# Patient Record
Sex: FEMALE | Race: OTHER | NOT HISPANIC OR LATINO | Employment: UNEMPLOYED | URBAN - METROPOLITAN AREA
[De-identification: names, ages, dates, MRNs, and addresses within clinical notes are randomized per-mention and may not be internally consistent; named-entity substitution may affect disease eponyms.]

---

## 2023-10-23 LAB
EXTERNAL ABO GROUPING: NORMAL
EXTERNAL ANTIBODY SCREEN: NORMAL
EXTERNAL CHLAMYDIA SCREEN: NEGATIVE
EXTERNAL GONORRHEA SCREEN: NEGATIVE
EXTERNAL HEMOGLOBIN: 10.4 G/DL
EXTERNAL HEPATITIS B SURFACE ANTIGEN: NEGATIVE
EXTERNAL RH FACTOR: POSITIVE
EXTERNAL RUBELLA IGG QUANTITATION: POSITIVE
GLUCOSE, 1 HR SCREEN, PREG EXTERNAL: 85 MG/DL
HEPATITIS C VIRUS AB PRESENCE IN SERUM EXTERNAL: NONREACTIVE
HIV 1/ 2 AG/AB SCREEN EXTERNAL: NEGATIVE
SYPHILIS TOTAL AB EXTERNAL: NEGATIVE
VARICELLA ZOSTER IGG EXTERNAL: POSITIVE

## 2024-01-16 ENCOUNTER — INITIAL PRENATAL (OUTPATIENT)
Dept: OBSTETRICS AND GYNECOLOGY | Facility: HOSPITAL | Age: 34
End: 2024-01-16
Payer: COMMERCIAL

## 2024-01-16 ENCOUNTER — APPOINTMENT (OUTPATIENT)
Dept: LAB | Facility: LAB | Age: 34
End: 2024-01-16
Payer: COMMERCIAL

## 2024-01-16 VITALS — WEIGHT: 188 LBS | DIASTOLIC BLOOD PRESSURE: 67 MMHG | SYSTOLIC BLOOD PRESSURE: 100 MMHG

## 2024-01-16 DIAGNOSIS — Z34.03 ENCOUNTER FOR SUPERVISION OF NORMAL FIRST PREGNANCY IN THIRD TRIMESTER (HHS-HCC): Primary | ICD-10-CM

## 2024-01-16 DIAGNOSIS — Z34.80 SUPERVISION OF OTHER NORMAL PREGNANCY, ANTEPARTUM (HHS-HCC): ICD-10-CM

## 2024-01-16 DIAGNOSIS — O99.019 ANEMIA DURING PREGNANCY (HHS-HCC): ICD-10-CM

## 2024-01-16 DIAGNOSIS — R87.613 HIGH GRADE SQUAMOUS INTRAEPITHELIAL LESION (HGSIL) ON CYTOLOGIC SMEAR OF CERVIX: ICD-10-CM

## 2024-01-16 DIAGNOSIS — Z78.9 LANGUAGE BARRIER: ICD-10-CM

## 2024-01-16 PROCEDURE — 99213 OFFICE O/P EST LOW 20 MIN: CPT | Performed by: OBSTETRICS & GYNECOLOGY

## 2024-01-16 PROCEDURE — 87081 CULTURE SCREEN ONLY: CPT | Performed by: OBSTETRICS & GYNECOLOGY

## 2024-01-16 ASSESSMENT — EDINBURGH POSTNATAL DEPRESSION SCALE (EPDS)
I HAVE BEEN ANXIOUS OR WORRIED FOR NO GOOD REASON: NO, NOT AT ALL
THINGS HAVE BEEN GETTING ON TOP OF ME: NO, I HAVE BEEN COPING AS WELL AS EVER
I HAVE LOOKED FORWARD WITH ENJOYMENT TO THINGS: AS MUCH AS I EVER DID
I HAVE BEEN SO UNHAPPY THAT I HAVE BEEN CRYING: NO, NEVER
I HAVE BLAMED MYSELF UNNECESSARILY WHEN THINGS WENT WRONG: NO, NEVER
THE THOUGHT OF HARMING MYSELF HAS OCCURRED TO ME: NEVER
I HAVE BEEN ABLE TO LAUGH AND SEE THE FUNNY SIDE OF THINGS: AS MUCH AS I ALWAYS COULD
I HAVE FELT SAD OR MISERABLE: NO, NOT AT ALL
I HAVE BEEN SO UNHAPPY THAT I HAVE HAD DIFFICULTY SLEEPING: NOT AT ALL

## 2024-01-16 NOTE — PROGRESS NOTES
Initial Prenatal Visit    SUBJECTIVE  Ricardo Pennington is a 34 y.o.  at 37w4d with an estimated date of delivery of 2024, by Ultrasound who presents for an initial prenatal visit.    Patient is a transfer of care from [facility]. Has not been see since ~28 weeks. Denies contractions, bleeding, leaking fluid. Good fetal movement.    OB/GYN History  OB History    Para Term  AB Living   7 6 6     6   SAB IAB Ectopic Multiple Live Births           6      # Outcome Date GA Lbr Claudy/2nd Weight Sex Delivery Anes PTL Lv   7 Current            6 Term 21 41w0d  3.6 kg M Vag-Spont None N HARSH      [place]   5 Term  40w0d  3.2 kg M Vag-Spont None N HARSH      Birth Comments: born in [place]   4 Term  40w0d  3.1 kg F Vag-Spont None N HARSH      Birth Comments: born in [place]   3 Term  40w0d  3.2 kg F Vag-Spont None N HARSH      Birth Comments: born in [place]   2 Term  40w0d  3.8 kg M Vag-Spont None N HARSH      Birth Comments: born in [place]   1 Term  40w0d  4.1 kg M Vag-Spont None N HARSH      Birth Comments: born in [place]      No LMP recorded. Patient is pregnant.    Prior pregnancy complications: Denies    The following portions of the chart were reviewed this encounter and updated as appropriate:    Tobacco  Allergies  Meds  Problems  Med Hx  Surg Hx  Fam Hx         Review of Systems  Review of Systems   Constitutional:  Negative for chills and fever.   Eyes:  Negative for visual disturbance.   Respiratory:  Negative for cough and shortness of breath.    Cardiovascular:  Negative for chest pain and palpitations.   Gastrointestinal:  Negative for abdominal pain, constipation, diarrhea, nausea and vomiting.   Genitourinary:  Negative for dyspareunia, dysuria, frequency, hematuria, urgency, vaginal bleeding and vaginal discharge.   Neurological:  Negative for dizziness, weakness and headaches.       OBJECTIVE  Vitals:    24 1521   BP: 100/67    Weight: 85.3 kg (188 lb)          Expected Total Weight Gain: Could not be calculated  Pregravid BMI: Could not be calculated    Physical Exam  See prenatal physical exam tab    ASSESSMENT & PLAN    Language barrier  - Swahili  used for entire visit    Encounter for supervision of normal pregnancy, antepartum  - GBS collected today    Anemia during pregnancy  - Taking oral iron  - Repeat CBC today    High grade squamous intraepithelial lesion (HGSIL) on cytologic smear of cervix  - Reviewed her abnormal pap without follow up from 01/2023  - Discussed need for colposcopy in postpartum period  - Consider setting patient up with patient navigator to ensure follow up    Routine care  -Ultrasound reviewed, earliest available 26 weeks  -Initial prenatal labs reviewed and entered  -OB education provided and precautions discussed    Pregnancy: Morris     Delivery Plans  Planned delivery method: Vaginal  Planned delivery location: [facility]      Follow up in 1 weeks for return OB visit.    Jesenia Noland MD  Obstetrics & Gynecology  01/17/24

## 2024-01-17 PROBLEM — Z34.90 ENCOUNTER FOR SUPERVISION OF NORMAL PREGNANCY, ANTEPARTUM (HHS-HCC): Status: ACTIVE | Noted: 2024-01-17

## 2024-01-17 PROBLEM — R87.613 HIGH GRADE SQUAMOUS INTRAEPITHELIAL LESION (HGSIL) ON CYTOLOGIC SMEAR OF CERVIX: Status: ACTIVE | Noted: 2024-01-17

## 2024-01-17 PROBLEM — Z78.9 LANGUAGE BARRIER: Status: ACTIVE | Noted: 2024-01-17

## 2024-01-17 PROBLEM — O99.019 ANEMIA DURING PREGNANCY (HHS-HCC): Status: ACTIVE | Noted: 2024-01-17

## 2024-01-17 ASSESSMENT — ENCOUNTER SYMPTOMS
ABDOMINAL PAIN: 0
WEAKNESS: 0
HEADACHES: 0
HEMATURIA: 0
NAUSEA: 0
DIARRHEA: 0
PALPITATIONS: 0
FEVER: 0
COUGH: 0
FREQUENCY: 0
SHORTNESS OF BREATH: 0
CHILLS: 0
DIZZINESS: 0
VOMITING: 0
DYSURIA: 0
CONSTIPATION: 0

## 2024-01-17 NOTE — ASSESSMENT & PLAN NOTE
- Reviewed her abnormal pap without follow up from 01/2023  - Discussed need for colposcopy in postpartum period  - Consider setting patient up with patient navigator to ensure follow up

## 2024-01-18 LAB — GP B STREP GENITAL QL CULT: NORMAL

## 2024-01-23 ENCOUNTER — ROUTINE PRENATAL (OUTPATIENT)
Dept: OBSTETRICS AND GYNECOLOGY | Facility: HOSPITAL | Age: 34
End: 2024-01-23
Payer: COMMERCIAL

## 2024-01-23 VITALS — WEIGHT: 192 LBS | DIASTOLIC BLOOD PRESSURE: 66 MMHG | SYSTOLIC BLOOD PRESSURE: 106 MMHG

## 2024-01-23 DIAGNOSIS — Z34.03 ENCOUNTER FOR SUPERVISION OF NORMAL FIRST PREGNANCY IN THIRD TRIMESTER (HHS-HCC): ICD-10-CM

## 2024-01-23 PROCEDURE — 99213 OFFICE O/P EST LOW 20 MIN: CPT | Mod: TH | Performed by: OBSTETRICS & GYNECOLOGY

## 2024-01-23 PROCEDURE — 99213 OFFICE O/P EST LOW 20 MIN: CPT | Performed by: OBSTETRICS & GYNECOLOGY

## 2024-01-23 ASSESSMENT — ENCOUNTER SYMPTOMS
DEPRESSION: 0
OCCASIONAL FEELINGS OF UNSTEADINESS: 0
LOSS OF SENSATION IN FEET: 0

## 2024-01-23 NOTE — PROGRESS NOTES
SUBJECTIVE  Ricardo Pennington is a 34 y.o.  at 38w4d with an estimated date of delivery of 2024, by Ultrasound who presents for a routine prenatal visit.    She denies loss of fluid, vaginal bleeding, regular contractions/cramping, and decreased fetal movement.    OBJECTIVE  Vitals:    24 1416   BP: 106/66   Weight: 87.1 kg (192 lb)          ASSESSMENT & PLAN    Encounter for supervision of normal pregnancy, antepartum  - Up to date on care  - Precautions discussed    Follow up in 1 weeks for return OB visit.    Jesenia Noland MD  Obstetrics & Gynecology  24

## 2024-01-30 ENCOUNTER — HOSPITAL ENCOUNTER (INPATIENT)
Facility: HOSPITAL | Age: 34
LOS: 2 days | Discharge: HOME | End: 2024-02-01
Attending: OBSTETRICS & GYNECOLOGY | Admitting: OBSTETRICS & GYNECOLOGY
Payer: COMMERCIAL

## 2024-01-30 ENCOUNTER — ROUTINE PRENATAL (OUTPATIENT)
Dept: OBSTETRICS AND GYNECOLOGY | Facility: HOSPITAL | Age: 34
End: 2024-01-30
Payer: COMMERCIAL

## 2024-01-30 VITALS — SYSTOLIC BLOOD PRESSURE: 95 MMHG | WEIGHT: 187 LBS | DIASTOLIC BLOOD PRESSURE: 63 MMHG

## 2024-01-30 DIAGNOSIS — Z37.9 NORMAL LABOR (HHS-HCC): Primary | ICD-10-CM

## 2024-01-30 DIAGNOSIS — Z34.03 ENCOUNTER FOR SUPERVISION OF NORMAL FIRST PREGNANCY IN THIRD TRIMESTER (HHS-HCC): ICD-10-CM

## 2024-01-30 LAB
ABO GROUP (TYPE) IN BLOOD: NORMAL
ANTIBODY SCREEN: NORMAL
ERYTHROCYTE [DISTWIDTH] IN BLOOD BY AUTOMATED COUNT: 14.6 % (ref 11.5–14.5)
HCT VFR BLD AUTO: 34.9 % (ref 36–46)
HGB BLD-MCNC: 11.2 G/DL (ref 12–16)
MCH RBC QN AUTO: 27.9 PG (ref 26–34)
MCHC RBC AUTO-ENTMCNC: 32.1 G/DL (ref 32–36)
MCV RBC AUTO: 87 FL (ref 80–100)
NRBC BLD-RTO: 0 /100 WBCS (ref 0–0)
PLATELET # BLD AUTO: 236 X10*3/UL (ref 150–450)
RBC # BLD AUTO: 4.01 X10*6/UL (ref 4–5.2)
RH FACTOR (ANTIGEN D): NORMAL
TREPONEMA PALLIDUM IGG+IGM AB [PRESENCE] IN SERUM OR PLASMA BY IMMUNOASSAY: REACTIVE
WBC # BLD AUTO: 9.6 X10*3/UL (ref 4.4–11.3)

## 2024-01-30 PROCEDURE — 36415 COLL VENOUS BLD VENIPUNCTURE: CPT | Performed by: STUDENT IN AN ORGANIZED HEALTH CARE EDUCATION/TRAINING PROGRAM

## 2024-01-30 PROCEDURE — 85027 COMPLETE CBC AUTOMATED: CPT | Performed by: STUDENT IN AN ORGANIZED HEALTH CARE EDUCATION/TRAINING PROGRAM

## 2024-01-30 PROCEDURE — 99213 OFFICE O/P EST LOW 20 MIN: CPT | Mod: TH,27 | Performed by: OBSTETRICS & GYNECOLOGY

## 2024-01-30 PROCEDURE — 99213 OFFICE O/P EST LOW 20 MIN: CPT | Performed by: OBSTETRICS & GYNECOLOGY

## 2024-01-30 PROCEDURE — 86780 TREPONEMA PALLIDUM: CPT | Performed by: STUDENT IN AN ORGANIZED HEALTH CARE EDUCATION/TRAINING PROGRAM

## 2024-01-30 PROCEDURE — 99222 1ST HOSP IP/OBS MODERATE 55: CPT | Performed by: STUDENT IN AN ORGANIZED HEALTH CARE EDUCATION/TRAINING PROGRAM

## 2024-01-30 PROCEDURE — 59050 FETAL MONITOR W/REPORT: CPT

## 2024-01-30 PROCEDURE — 59409 OBSTETRICAL CARE: CPT | Performed by: OBSTETRICS & GYNECOLOGY

## 2024-01-30 PROCEDURE — 7210000002 HC LABOR PER HOUR

## 2024-01-30 PROCEDURE — 59409 OBSTETRICAL CARE: CPT

## 2024-01-30 PROCEDURE — 2500000004 HC RX 250 GENERAL PHARMACY W/ HCPCS (ALT 636 FOR OP/ED): Performed by: STUDENT IN AN ORGANIZED HEALTH CARE EDUCATION/TRAINING PROGRAM

## 2024-01-30 PROCEDURE — 86318 IA INFECTIOUS AGENT ANTIBODY: CPT | Performed by: STUDENT IN AN ORGANIZED HEALTH CARE EDUCATION/TRAINING PROGRAM

## 2024-01-30 PROCEDURE — 1210000001 HC SEMI-PRIVATE ROOM DAILY

## 2024-01-30 PROCEDURE — 7100000016 HC LABOR RECOVERY PER HOUR

## 2024-01-30 PROCEDURE — 86901 BLOOD TYPING SEROLOGIC RH(D): CPT | Performed by: STUDENT IN AN ORGANIZED HEALTH CARE EDUCATION/TRAINING PROGRAM

## 2024-01-30 PROCEDURE — 2500000001 HC RX 250 WO HCPCS SELF ADMINISTERED DRUGS (ALT 637 FOR MEDICARE OP)

## 2024-01-30 RX ORDER — METOCLOPRAMIDE HYDROCHLORIDE 5 MG/ML
10 INJECTION INTRAMUSCULAR; INTRAVENOUS EVERY 6 HOURS PRN
Status: DISCONTINUED | OUTPATIENT
Start: 2024-01-30 | End: 2024-01-30

## 2024-01-30 RX ORDER — MISOPROSTOL 200 UG/1
800 TABLET ORAL ONCE AS NEEDED
Status: DISCONTINUED | OUTPATIENT
Start: 2024-01-30 | End: 2024-02-01 | Stop reason: HOSPADM

## 2024-01-30 RX ORDER — OXYTOCIN/0.9 % SODIUM CHLORIDE 30/500 ML
60 PLASTIC BAG, INJECTION (ML) INTRAVENOUS ONCE AS NEEDED
Status: DISCONTINUED | OUTPATIENT
Start: 2024-01-30 | End: 2024-01-30

## 2024-01-30 RX ORDER — NIFEDIPINE 10 MG/1
10 CAPSULE ORAL ONCE AS NEEDED
Status: DISCONTINUED | OUTPATIENT
Start: 2024-01-30 | End: 2024-01-30

## 2024-01-30 RX ORDER — SIMETHICONE 80 MG
80 TABLET,CHEWABLE ORAL 4 TIMES DAILY PRN
Status: DISCONTINUED | OUTPATIENT
Start: 2024-01-30 | End: 2024-02-01 | Stop reason: HOSPADM

## 2024-01-30 RX ORDER — LABETALOL HYDROCHLORIDE 5 MG/ML
20 INJECTION, SOLUTION INTRAVENOUS ONCE AS NEEDED
Status: DISCONTINUED | OUTPATIENT
Start: 2024-01-30 | End: 2024-02-01 | Stop reason: HOSPADM

## 2024-01-30 RX ORDER — ONDANSETRON HYDROCHLORIDE 2 MG/ML
4 INJECTION, SOLUTION INTRAVENOUS EVERY 6 HOURS PRN
Status: DISCONTINUED | OUTPATIENT
Start: 2024-01-30 | End: 2024-01-30

## 2024-01-30 RX ORDER — LIDOCAINE 560 MG/1
1 PATCH PERCUTANEOUS; TOPICAL; TRANSDERMAL
Status: DISCONTINUED | OUTPATIENT
Start: 2024-01-30 | End: 2024-02-01 | Stop reason: HOSPADM

## 2024-01-30 RX ORDER — TRANEXAMIC ACID 100 MG/ML
1000 INJECTION, SOLUTION INTRAVENOUS ONCE AS NEEDED
Status: DISCONTINUED | OUTPATIENT
Start: 2024-01-30 | End: 2024-01-30

## 2024-01-30 RX ORDER — TRANEXAMIC ACID 100 MG/ML
1000 INJECTION, SOLUTION INTRAVENOUS ONCE AS NEEDED
Status: DISCONTINUED | OUTPATIENT
Start: 2024-01-30 | End: 2024-02-01 | Stop reason: HOSPADM

## 2024-01-30 RX ORDER — OXYTOCIN 10 [USP'U]/ML
10 INJECTION, SOLUTION INTRAMUSCULAR; INTRAVENOUS ONCE AS NEEDED
Status: DISCONTINUED | OUTPATIENT
Start: 2024-01-30 | End: 2024-01-30

## 2024-01-30 RX ORDER — OXYTOCIN 10 [USP'U]/ML
10 INJECTION, SOLUTION INTRAMUSCULAR; INTRAVENOUS ONCE AS NEEDED
Status: DISCONTINUED | OUTPATIENT
Start: 2024-01-30 | End: 2024-02-01 | Stop reason: HOSPADM

## 2024-01-30 RX ORDER — LIDOCAINE HYDROCHLORIDE 10 MG/ML
30 INJECTION INFILTRATION; PERINEURAL ONCE AS NEEDED
Status: DISCONTINUED | OUTPATIENT
Start: 2024-01-30 | End: 2024-01-30

## 2024-01-30 RX ORDER — POLYETHYLENE GLYCOL 3350 17 G/17G
17 POWDER, FOR SOLUTION ORAL 2 TIMES DAILY PRN
Status: DISCONTINUED | OUTPATIENT
Start: 2024-01-30 | End: 2024-02-01 | Stop reason: HOSPADM

## 2024-01-30 RX ORDER — ONDANSETRON HYDROCHLORIDE 2 MG/ML
4 INJECTION, SOLUTION INTRAVENOUS EVERY 6 HOURS PRN
Status: DISCONTINUED | OUTPATIENT
Start: 2024-01-30 | End: 2024-02-01 | Stop reason: HOSPADM

## 2024-01-30 RX ORDER — ONDANSETRON 4 MG/1
4 TABLET, FILM COATED ORAL EVERY 6 HOURS PRN
Status: DISCONTINUED | OUTPATIENT
Start: 2024-01-30 | End: 2024-01-30

## 2024-01-30 RX ORDER — METHYLERGONOVINE MALEATE 0.2 MG/ML
0.2 INJECTION INTRAVENOUS ONCE AS NEEDED
Status: DISCONTINUED | OUTPATIENT
Start: 2024-01-30 | End: 2024-02-01 | Stop reason: HOSPADM

## 2024-01-30 RX ORDER — ADHESIVE BANDAGE
30 BANDAGE TOPICAL
Status: DISCONTINUED | OUTPATIENT
Start: 2024-01-30 | End: 2024-02-01 | Stop reason: HOSPADM

## 2024-01-30 RX ORDER — SODIUM CHLORIDE, SODIUM LACTATE, POTASSIUM CHLORIDE, CALCIUM CHLORIDE 600; 310; 30; 20 MG/100ML; MG/100ML; MG/100ML; MG/100ML
125 INJECTION, SOLUTION INTRAVENOUS CONTINUOUS
Status: DISCONTINUED | OUTPATIENT
Start: 2024-01-30 | End: 2024-01-30

## 2024-01-30 RX ORDER — DIPHENHYDRAMINE HYDROCHLORIDE 50 MG/ML
25 INJECTION INTRAMUSCULAR; INTRAVENOUS EVERY 6 HOURS PRN
Status: DISCONTINUED | OUTPATIENT
Start: 2024-01-30 | End: 2024-02-01 | Stop reason: HOSPADM

## 2024-01-30 RX ORDER — IBUPROFEN 600 MG/1
600 TABLET ORAL EVERY 6 HOURS
Status: DISCONTINUED | OUTPATIENT
Start: 2024-01-30 | End: 2024-02-01 | Stop reason: HOSPADM

## 2024-01-30 RX ORDER — LOPERAMIDE HYDROCHLORIDE 2 MG/1
4 CAPSULE ORAL EVERY 2 HOUR PRN
Status: DISCONTINUED | OUTPATIENT
Start: 2024-01-30 | End: 2024-01-30

## 2024-01-30 RX ORDER — HYDRALAZINE HYDROCHLORIDE 20 MG/ML
5 INJECTION INTRAMUSCULAR; INTRAVENOUS ONCE AS NEEDED
Status: DISCONTINUED | OUTPATIENT
Start: 2024-01-30 | End: 2024-01-30

## 2024-01-30 RX ORDER — BISACODYL 10 MG/1
10 SUPPOSITORY RECTAL DAILY PRN
Status: DISCONTINUED | OUTPATIENT
Start: 2024-01-30 | End: 2024-02-01 | Stop reason: HOSPADM

## 2024-01-30 RX ORDER — METOCLOPRAMIDE 10 MG/1
10 TABLET ORAL EVERY 6 HOURS PRN
Status: DISCONTINUED | OUTPATIENT
Start: 2024-01-30 | End: 2024-01-30

## 2024-01-30 RX ORDER — MISOPROSTOL 200 UG/1
800 TABLET ORAL ONCE AS NEEDED
Status: DISCONTINUED | OUTPATIENT
Start: 2024-01-30 | End: 2024-01-30

## 2024-01-30 RX ORDER — METHYLERGONOVINE MALEATE 0.2 MG/ML
0.2 INJECTION INTRAVENOUS ONCE AS NEEDED
Status: DISCONTINUED | OUTPATIENT
Start: 2024-01-30 | End: 2024-01-30

## 2024-01-30 RX ORDER — HYDRALAZINE HYDROCHLORIDE 20 MG/ML
5 INJECTION INTRAMUSCULAR; INTRAVENOUS ONCE AS NEEDED
Status: DISCONTINUED | OUTPATIENT
Start: 2024-01-30 | End: 2024-02-01 | Stop reason: HOSPADM

## 2024-01-30 RX ORDER — LOPERAMIDE HYDROCHLORIDE 2 MG/1
4 CAPSULE ORAL EVERY 2 HOUR PRN
Status: DISCONTINUED | OUTPATIENT
Start: 2024-01-30 | End: 2024-02-01 | Stop reason: HOSPADM

## 2024-01-30 RX ORDER — LABETALOL HYDROCHLORIDE 5 MG/ML
20 INJECTION, SOLUTION INTRAVENOUS ONCE AS NEEDED
Status: DISCONTINUED | OUTPATIENT
Start: 2024-01-30 | End: 2024-01-30

## 2024-01-30 RX ORDER — CARBOPROST TROMETHAMINE 250 UG/ML
250 INJECTION, SOLUTION INTRAMUSCULAR ONCE AS NEEDED
Status: DISCONTINUED | OUTPATIENT
Start: 2024-01-30 | End: 2024-02-01 | Stop reason: HOSPADM

## 2024-01-30 RX ORDER — CARBOPROST TROMETHAMINE 250 UG/ML
250 INJECTION, SOLUTION INTRAMUSCULAR ONCE AS NEEDED
Status: DISCONTINUED | OUTPATIENT
Start: 2024-01-30 | End: 2024-01-30

## 2024-01-30 RX ORDER — DIPHENHYDRAMINE HCL 25 MG
25 CAPSULE ORAL EVERY 6 HOURS PRN
Status: DISCONTINUED | OUTPATIENT
Start: 2024-01-30 | End: 2024-02-01 | Stop reason: HOSPADM

## 2024-01-30 RX ORDER — NIFEDIPINE 10 MG/1
10 CAPSULE ORAL ONCE AS NEEDED
Status: DISCONTINUED | OUTPATIENT
Start: 2024-01-30 | End: 2024-02-01 | Stop reason: HOSPADM

## 2024-01-30 RX ORDER — TERBUTALINE SULFATE 1 MG/ML
0.25 INJECTION SUBCUTANEOUS ONCE AS NEEDED
Status: DISCONTINUED | OUTPATIENT
Start: 2024-01-30 | End: 2024-01-30

## 2024-01-30 RX ORDER — ACETAMINOPHEN 325 MG/1
975 TABLET ORAL EVERY 6 HOURS
Status: DISCONTINUED | OUTPATIENT
Start: 2024-01-30 | End: 2024-02-01 | Stop reason: HOSPADM

## 2024-01-30 RX ORDER — ONDANSETRON 4 MG/1
4 TABLET, FILM COATED ORAL EVERY 6 HOURS PRN
Status: DISCONTINUED | OUTPATIENT
Start: 2024-01-30 | End: 2024-02-01 | Stop reason: HOSPADM

## 2024-01-30 RX ORDER — OXYTOCIN/0.9 % SODIUM CHLORIDE 30/500 ML
60 PLASTIC BAG, INJECTION (ML) INTRAVENOUS ONCE AS NEEDED
Status: DISCONTINUED | OUTPATIENT
Start: 2024-01-30 | End: 2024-02-01 | Stop reason: HOSPADM

## 2024-01-30 RX ADMIN — IBUPROFEN 600 MG: 600 TABLET ORAL at 21:36

## 2024-01-30 RX ADMIN — SODIUM CHLORIDE, POTASSIUM CHLORIDE, SODIUM LACTATE AND CALCIUM CHLORIDE 125 ML/HR: 600; 310; 30; 20 INJECTION, SOLUTION INTRAVENOUS at 20:00

## 2024-01-30 RX ADMIN — ACETAMINOPHEN 975 MG: 325 TABLET ORAL at 21:36

## 2024-01-30 SDOH — HEALTH STABILITY: MENTAL HEALTH: WERE YOU ABLE TO COMPLETE ALL THE BEHAVIORAL HEALTH SCREENINGS?: NO

## 2024-01-30 ASSESSMENT — PAIN - FUNCTIONAL ASSESSMENT: PAIN_FUNCTIONAL_ASSESSMENT: 0-10

## 2024-01-30 ASSESSMENT — PAIN SCALES - GENERAL
PAINLEVEL_OUTOF10: 1
PAINLEVEL_OUTOF10: 1
PAINLEVEL_OUTOF10: 0 - NO PAIN

## 2024-01-30 NOTE — ASSESSMENT & PLAN NOTE
- Declines induction at 41 weeks  - Discussed I am suspicious for early labor symptoms  - Planning to go to L&D if contractions get any closer, has bleeding, leaking fluid, or decreased fetal movement

## 2024-01-30 NOTE — PROGRESS NOTES
SUBJECTIVE  Ricardo Pennington is a 34 y.o.  at 39w4d with an estimated date of delivery of 2024, by Ultrasound who presents for a routine prenatal visit.    She denies loss of fluid, vaginal bleeding, contractions every 20 minutes, and decreased fetal movement.    OBJECTIVE  Vitals:    24 1414   BP: 95/63   Weight: 84.8 kg (187 lb)          ASSESSMENT & PLAN    Encounter for supervision of normal pregnancy, antepartum  - Declines induction at 41 weeks  - Discussed I am suspicious for early labor symptoms  - Planning to go to L&D if contractions get any closer, has bleeding, leaking fluid, or decreased fetal movement    Follow up in 1 weeks for return OB visit.    Jesenia Noland MD  Obstetrics & Gynecology  24

## 2024-01-31 LAB
ABO GROUP (TYPE) IN BLOOD: NORMAL
RH FACTOR (ANTIGEN D): NORMAL
RPR SER QL: NONREACTIVE
T PALLIDUM AB SER QL AGGL: NONREACTIVE

## 2024-01-31 PROCEDURE — 2500000001 HC RX 250 WO HCPCS SELF ADMINISTERED DRUGS (ALT 637 FOR MEDICARE OP)

## 2024-01-31 PROCEDURE — 36415 COLL VENOUS BLD VENIPUNCTURE: CPT | Performed by: OBSTETRICS & GYNECOLOGY

## 2024-01-31 PROCEDURE — 1210000001 HC SEMI-PRIVATE ROOM DAILY

## 2024-01-31 RX ADMIN — ACETAMINOPHEN 975 MG: 325 TABLET ORAL at 21:14

## 2024-01-31 RX ADMIN — IBUPROFEN 600 MG: 600 TABLET ORAL at 15:57

## 2024-01-31 RX ADMIN — IBUPROFEN 600 MG: 600 TABLET ORAL at 21:14

## 2024-01-31 RX ADMIN — ACETAMINOPHEN 975 MG: 325 TABLET ORAL at 15:56

## 2024-01-31 RX ADMIN — ACETAMINOPHEN 975 MG: 325 TABLET ORAL at 04:01

## 2024-01-31 RX ADMIN — IBUPROFEN 600 MG: 600 TABLET ORAL at 10:02

## 2024-01-31 RX ADMIN — IBUPROFEN 600 MG: 600 TABLET ORAL at 04:01

## 2024-01-31 RX ADMIN — ACETAMINOPHEN 975 MG: 325 TABLET ORAL at 10:24

## 2024-01-31 SDOH — HEALTH STABILITY: MENTAL HEALTH: NON-SPECIFIC ACTIVE SUICIDAL THOUGHTS (PAST 1 MONTH): NO

## 2024-01-31 SDOH — HEALTH STABILITY: MENTAL HEALTH: WISH TO BE DEAD (PAST 1 MONTH): NO

## 2024-01-31 SDOH — HEALTH STABILITY: MENTAL HEALTH: SUICIDAL BEHAVIOR (LIFETIME): NO

## 2024-01-31 ASSESSMENT — PAIN SCALES - GENERAL
PAINLEVEL_OUTOF10: 0 - NO PAIN

## 2024-01-31 NOTE — CARE PLAN
Problem: Postpartum  Goal: Experiences normal postpartum course  Outcome: Progressing     Problem: Postpartum  Goal: Minimal s/sx of HDP and BP<160/110  Outcome: Progressing

## 2024-01-31 NOTE — L&D DELIVERY NOTE
OB Delivery Note  2024  Ricardo Pennington  34 y.o.   Vaginal, Spontaneous       after spontaneous onset of labor. Patient with SROM shortly prior to delivery. Hemostatic first degree laceration noted and not repaired.    Gestational Age: 39w4d  /Para:   Estimated Blood Loss From Delivery: 200 mL    Aditi David [[id number]]      Labor Events    Rupture type: Spontaneous  Fluid color: Meconium  Labor type: Spontaneous Onset of Labor  Labor allowed to proceed with plans for an attempted vaginal birth?: Yes  Augmentation: None  Complications: None       Labor Event Times    Labor onset date/time: 2024  Dilation complete date/time: 2024  Start pushing date/time: 2024       Labor Length    1st stage: 0h 47m  2nd stage: 0h 01m  3rd stage: 0h 05m       Placenta    Placenta delivery date/time: 2024  Placenta removal: Spontaneous  Placenta appearance: Intact  Placenta disposition: discarded       Cord    Vessels: 3 vessels  Complications: None  Delayed cord clamping?: Yes  Cord clamped date/time: 2024  Cord blood disposition: Lab  Gases sent?: No       Lacerations    Episiotomy: None  Perineal laceration: 1st  Perineal laceration repaired?: No  Other lacerations?: No  Repair suture: None       Anesthesia    Method: None       Operative Delivery    Forceps attempted?: No  Vacuum extractor attempted?: No       Shoulder Dystocia    Shoulder dystocia present?: No       [place] Delivery    Time head delivered: 2024 20:13:00  Birth date/time: 2024 20:13:00  Delivery type: Vaginal, Spontaneous  Complications: None       Resuscitation    Method: Suctioning, Tactile stimulation       Apgars    Living status: Living  Apgar Component Scores:  1 min.:  5 min.:  10 min.:  15 min.:  20 min.:    Skin color:  1  1       Heart rate:  2  2       Reflex irritability:  2  2       Muscle tone:  1  2       Respiratory effort:  2  2       Total:  8  9       Apgars  assigned by: AMEYA SHELL       Delivery Providers    Delivering clinician: Radha Willett MD   Provider Role    Catrachita Kirby, RN Delivery Nurse    Ameya Shell, RN Nursery Nurse    Edel Grewal MD Resident                 Edel Grewal MD PGY1

## 2024-01-31 NOTE — LACTATION NOTE
Lactation Consultant Note  Lactation Consultation  Reason for Consult: Initial assessment  Consultant Name: SERA Galan ( [id number] utilized for visit)    Maternal Information  Infant to breast within first 2 hours of birth?: Yes    Maternal Assessment       Infant Assessment  Infant Behavior: Content after feeding    Feeding Assessment  Nutrition Source: Breastmilk, Formula (per mother’s request)  Feeding Method: Nursing at the breast, Paced bottle  Unable to assess infant feeding at this time: Maternal request    LATCH TOOL       Breast Pump       Other OB Lactation Tools       Patient Follow-up  Inpatient Lactation Follow-up Needed : No (as desired by parent)  Lactation Professional - OK to Discharge: Yes    Other OB Lactation Documentation       Recommendations/Summary  Mother reports that she is experienced and feels that she has no milk at this time. She has decided to supplement with formula during the colostrum phase and plans to continue breastfeeding. She denies needs or concerns at this time. She is aware of support services for lactation available for her.

## 2024-01-31 NOTE — DISCHARGE INSTRUCTIONS

## 2024-01-31 NOTE — H&P
Obstetrical Admission History and Physical     Ricardo Pennington is a 34 y.o.  at 39w4d. ALLYN: 2024, by Ultrasound. Estimated fetal weight: 8lb by [place]. She has had prenatal care with Dr. Noland .    Chief Complaint: No chief complaint on file.    Assessment/Plan    Active Labor  - Regular contractions every 2 minutes   - SVE 5/70/-2, 4/50/-3 in office earlier today   - Admit to L&D for labor  - Scanned cephalic   - Consented by Dr. Grewal  - Monitor VS per unit protocol  - Routine labs ordered   - Type and Cross 1u PRBC  - Encourage frequent position changes  - Regular diet  - Continuous fetal monitoring  - Pain management per pt request, declines epidural at this time  - Continue assessment of maternal and fetal well being  - Recheck as clinically indicated by maternal and/or fetal status  - Will AROM and/or start Pitocin for labor augmentation as needed  - Anticipate SVB    Maternal Well-being  - Vital signs stable and WNL  - Emotional support and reassurance provided  - All questions and concerns addressed    IUP @ 39w4d  - prenatal lab work reviewed  - Cat 2 tracing   - GBS negative  - continue routine prenatal care     Dispo: admit to [facility] for labor    Dr. Willett aware of admission  Pt report to L&D night team for further management of care    Irish Crane PA-C  24 7:54 PM  Vocera    Active Problems:  There are no active Hospital Problems.      Pregnancy Problems (from 24 to present)       Problem Noted Resolved    Encounter for supervision of normal pregnancy, antepartum 2024 by Jesenia Noland MD No    Priority:  Medium      Overview Addendum 2024  3:17 PM by Jesenia Noland MD     [] Initial BMI:   [x] Dating US: 26 w earliest viewed  [x] Prenatal Labs:   [x] Pap: Abnormal - see problem  [] Genetic Screening: Missed  [] bASA: Missed  [x] Anatomy US: Normal at [facility]  [x] 1hr GCT at 24-28wks: 85  [x] Tdap (27-36wks): Missed  [] Flu/COVID:   [] Rhogam (if Rh neg):    [x] GBS at 36 wks:   [x] Breastfeeding: Yes  [x] Postpartum Birth control method: Depo  [x] 39 weeks discussion of IOL vs. Expectant management: Discussed, inadequate dating  [x] Mode of delivery: Anticipate            Anemia during pregnancy 2024 by Jesenia Noland MD No    Priority:  Medium      High grade squamous intraepithelial lesion (HGSIL) on cytologic smear of cervix 2024 by Jesenia Noland MD No    Priority:  Medium      Grand multiparity in labor and delivery, unspecified trimester 2024 by Jesenia Noland MD No    Priority:  Medium      Language barrier 2024 by Jesenia Noland MD No    Priority:  Medium            Options for delivery have been discussed with the patient and she elects a vaginal delivery.  Labor has been discussed in detail. The risks, benefits, complications, alternatives, expected outcomes, potential problems during recuperation and recovery, and the risks of not performing the procedure were discussed with the patient. The patient stated understanding that the risks of delivery include, but are not limited to: death; reaction to medications; injury to bowel, bladder, ureters, uterus, cervix, vagina, and other pelvic and abdominal structures, infection; blood loss and possible need for transfusion; and potential need for surgery, including hysterectomy. The risks of injury to the infant during delivery were also discussed. All questions were answered. There was concurrence with the planned procedure, and the patient wanted to proceed.    Admit to inpatient status. I anticipate that this patient will require a stay exceeding at least 2 midnights for delivery and postpartum care.  Active management of labor.  Management of pregnancy complications, as indicated.    Minh Silva is a 34 y.o.  at 39w4d by stated ALLYN who presents to triage for labor. Contractions every 2 minutes. Has had some VB today. Denies LOF. Reports good FM.      Pregnancy notable for:   - language barrier, needs Swili    - anemia, on PO Fe  - transfer of care from [facility] at 37 weeks  - no prenatal care from 28-37 weeks   - HSIL pap 2023, for postpartum colposcopy     Obstetrical History   OB History    Para Term  AB Living   7 6 6     6   SAB IAB Ectopic Multiple Live Births           6      # Outcome Date GA Lbr Claudy/2nd Weight Sex Delivery Anes PTL Lv   7 Current            6 Term 21 41w0d  3.6 kg M Vag-Spont None N HARSH      [place]irth Comments[place]   5 Term  40w0d  3.2 kg M Vag-Spont None N HARSH      Birth Comments: born in [place]   4 Term  40w0d  3.1 kg F Vag-Spont None N HARSH      Birth Comments: born in [place]   3 Term  40w0d  3.2 kg F Vag-Spont None N HARSH      Birth Comments: born in [place]   2 Term  40w0d  3.8 kg M Vag-Spont None N HARSH      Birth Comments: born in [place]   1 Term  40w0d  4.1 kg M Vag-Spont None N HARSH      Birth Comments: born in [place]       Past Medical History  No past medical history on file.     Past Surgical History   No past surgical history on file.    Social History  Social History     Tobacco Use    Smoking status: Never    Smokeless tobacco: Never   Substance Use Topics    Alcohol use: Never     Substance and Sexual Activity   Drug Use Never       Allergies  Patient has no known allergies.     Medications  No medications prior to admission.       Objective    Last Vitals  Temp Pulse Resp BP MAP O2 Sat     99       98 %     Physical Examination  GENERAL: Examination reveals a well developed, well nourished, gravid female in no acute distress. She is alert and cooperative.  LUNGS: Normal respiratory effort  ABDOMEN:  palpates firm with contractions  CERVIX: 5/70/-2  NEUROLOGICAL: alert, oriented, normal speech, no focal findings or movement disorder noted  PSYCHOLOGICAL: awake and alert; oriented to person, place, and time    Non-Stress Test   Baseline Fetal  Heart Rate for Non-Stress Test: 135 BPM  Variability in Waveform for Non-Stress Test: (!) Minimal  Accelerations in Non-Stress Test: No  Decelerations in Non-Stress Test: (!) Late (1943, 1955)  Contractions in Non-Stress Test: Regular  NST Contraction Frequency: every 2 minutes  Interpretation of Non-Stress Test   Interpretation of Non-Stress Test: (!) Non-reactive  Comments on Non-Stress Test: Cat 2 Tracing    Lab Review  Labs in chart were reviewed.

## 2024-01-31 NOTE — PROGRESS NOTES
Postpartum Progress Note    Assessment/Plan   Ricardo Pennington is a 34 y.o., , who delivered at 39w4d gestation    Now PPD#1 s/p Vaginal, Spontaneous  on 2024   - continue routine postpartum care  - pain well controlled on po medications  - dvt risk score DVT Score: 2 , ppx with scds  - Hgb:   Results from last 7 days   Lab Units 24   HEMOGLOBIN g/dL 11.2*      + Syphilis Screen  - antibody + with negative confirmatory testing 2023, antibody negative Oct 2023   - antibody + on admission, confirmation testing pending  - discussed with patient, no new partners, will fu on pending lab    Maternal Well-Being  - emotional support provided  - HSIL, PP colpo scheduled     [facility] Feeding  - breastfeeding/pumping encouraged; lactation consult prn    Contraception  - education provided  - depo shot prior to discharge     Dispo  - anticipate d/c on PPD #2 if meeting all postpartum milestones  Follow up in 4-6 wk for postpartum visit with your OB provider.     Earlene Zepeda PA-C  Postpartum Pager [phone]    Principal Problem:    Normal labor    Pregnancy Problems (from 24 to present)       Problem Noted Resolved    Encounter for supervision of normal pregnancy, antepartum 2024 by Jesenia Noland MD No    Priority:  Medium      Overview Addendum 2024  3:17 PM by Jesenia Noland MD     [] Initial BMI:   [x] Dating US: 26 w earliest viewed  [x] Prenatal Labs:   [x] Pap: Abnormal - see problem  [] Genetic Screening: Missed  [] bASA: Missed  [x] Anatomy US: Normal at [facility]  [x] 1hr GCT at 24-28wks: 85  [x] Tdap (27-36wks): Missed  [] Flu/COVID:   [] Rhogam (if Rh neg):   [x] GBS at 36 wks:   [x] Breastfeeding: Yes  [x] Postpartum Birth control method: Depo  [x] 39 weeks discussion of IOL vs. Expectant management: Discussed, inadequate dating  [x] Mode of delivery: Anticipate            Anemia during pregnancy 2024 by Jesenia Noland MD No    Priority:  Medium      High  grade squamous intraepithelial lesion (HGSIL) on cytologic smear of cervix 1/17/2024 by Jesenia Noland MD No    Priority:  Medium      Language barrier 1/17/2024 by Jesenia Noland MD No    Priority:  Medium      Grand multiparity in labor and delivery, unspecified trimester 1/17/2024 by Jesenia Noland MD 1/31/2024 by COURT Villasenor-CNP          Hospital course: no complications  Vaginal Birth  Patient is currently breastfeedingThe patient's blood type is O POS. The baby's blood type is B POS . Rhogam is not indicated.    Subjective   Her pain is well controlled with current medications  She is passing flatus  She is ambulating well  She is tolerating a Adult diet Regular  She reports no breast or nursing problems  She denies emotional concerns today   Her plan for contraception is Depo Provera     Pt seen at the bedside in NAD. Denies pain. Doing well.   Denies CP, SOB, calf pain, fever, passage of large clots.     Objective   Allergies:   Patient has no known allergies.         Last Vitals:  Temp Pulse Resp BP MAP Pulse Ox   36.8 °C (98.2 °F) 74 16 102/65   95 %     Vitals Min/Max Last 24 Hours:  Temp  Min: 36.2 °C (97.2 °F)  Max: 37.3 °C (99.1 °F)  Pulse  Min: 56  Max: 104  Resp  Min: 16  Max: 20  BP  Min: 93/54  Max: 120/61    Intake/Output:     Intake/Output Summary (Last 24 hours) at 1/31/2024 1216  Last data filed at 1/30/2024 2200  Gross per 24 hour   Intake --   Output 475 ml   Net -475 ml       Physical Exam:  General: Examination reveals a well developed, well nourished, female, in no acute distress. She is alert and cooperative.  HEENT: External ears normal. Nose normal, no erythema or discharge.  Neck: supple, no significant adenopathy.  Lungs: breathing even and unlabored   Cardiac: warm and well perfused .  Fundus: firm and below umbilicus. Lochia light  Extremities: no redness or tenderness in the calves or thighs, no edema.  Neurological: alert, oriented, normal speech, no focal  findings or movement disorder noted.  Psychological: awake and alert; oriented to person, place, and time.  Skin: no rashes or lesions    Lab Data:  Labs in chart were reviewed.

## 2024-02-01 ENCOUNTER — PHARMACY VISIT (OUTPATIENT)
Dept: PHARMACY | Facility: CLINIC | Age: 34
End: 2024-02-01
Payer: MEDICAID

## 2024-02-01 VITALS
OXYGEN SATURATION: 94 % | HEART RATE: 65 BPM | TEMPERATURE: 98.2 F | DIASTOLIC BLOOD PRESSURE: 61 MMHG | RESPIRATION RATE: 18 BRPM | SYSTOLIC BLOOD PRESSURE: 104 MMHG

## 2024-02-01 PROCEDURE — 2500000001 HC RX 250 WO HCPCS SELF ADMINISTERED DRUGS (ALT 637 FOR MEDICARE OP)

## 2024-02-01 PROCEDURE — RXMED WILLOW AMBULATORY MEDICATION CHARGE

## 2024-02-01 RX ORDER — IBUPROFEN 600 MG/1
600 TABLET ORAL EVERY 6 HOURS
Qty: 30 TABLET | Refills: 0 | Status: SHIPPED | OUTPATIENT
Start: 2024-02-01 | End: 2024-02-23 | Stop reason: SDUPTHER

## 2024-02-01 RX ORDER — ACETAMINOPHEN 325 MG/1
975 TABLET ORAL EVERY 6 HOURS
Qty: 30 TABLET | Refills: 0 | Status: SHIPPED | OUTPATIENT
Start: 2024-02-01 | End: 2024-02-23 | Stop reason: SDUPTHER

## 2024-02-01 RX ADMIN — IBUPROFEN 600 MG: 600 TABLET ORAL at 04:05

## 2024-02-01 RX ADMIN — ACETAMINOPHEN 975 MG: 325 TABLET ORAL at 13:23

## 2024-02-01 RX ADMIN — IBUPROFEN 600 MG: 600 TABLET ORAL at 13:23

## 2024-02-01 RX ADMIN — ACETAMINOPHEN 975 MG: 325 TABLET ORAL at 04:05

## 2024-02-01 ASSESSMENT — PAIN SCALES - GENERAL
PAINLEVEL_OUTOF10: 0 - NO PAIN
PAINLEVEL_OUTOF10: 0 - NO PAIN

## 2024-02-01 NOTE — PROGRESS NOTES
Social Work Assessment       Patient: Ricardo Pennington  Address: [address] [place] [address]  Phone: [phone]    Primary Language: Swahili (non-english speaking)     Referral Reason: Car seat needed    Prenatal Care: Yes    [place] Name: Santiago   : 25    Other Children: Six other children ranging in ages from 2-15 yrs old    Household Composition: Lives at home with spouse, and dependent children     IPV/DV or Safety Concerns: Denies     Car-Seat: No  Safe Sleep Space: No   Safe Sleep Education: Yes    Transportation Concerns: Denies     School/Work/Income: Unemployed     Insurance: [employer] [facility]     Mental Health Diagnoses: Denies   Medication(s): N/A  Counseling: N/A    Supports: Spouse, and extended family     Substance Use History: Denies     Toxicology Screens: N/A     Department of Children and Family Services (DCFS): N/A      Assessment: SW met with Ms. Pennington at bedside to introduce self, and SW role.  Assessment completed with the assist of  via Ahaali.  Ms. Pennington lives at home with spouse, and six other dependent children age ranges two through fifteen.  No safety concerns.  Mother of baby verbalized understanding of the ABC's of safe sleep.  She denies having a car seat, or safe sleep location such as crib/bassinet.  SW provided a baby box, and was able to locate a car seat at [facility] located at [address] [place] [address].  Agency staff [place] agreed to hold car seat for spouse to  on this date.  Household receives SNAP benefits, and plans to apply for WIC.  She denies mental health history, including PPD.  Stable mood reported.  Ms. Pennington declined referral to [facility].  She denies having any other unmet needs at this time.  SW will continue to follow, and assist as needed      Plan: Ms. Pennington, and  boy Santiago are clear from SW perspective.  Mother of baby agreed to have her spouse  the car seat being held for family at [facility]  She  was advised spouse needed to pick car seat up at 3:30 pm on this date       Signature: Alice CHILDS [facility]

## 2024-02-01 NOTE — DISCHARGE SUMMARY
Discharge Summary    Admission Date: 1/30/2024  Discharge Date: 02/01/24      Discharge Diagnosis  Normal labor    Hospital Course  Delivery Date: 1/30/2024  8:13 PM   Delivery type: Vaginal, Spontaneous    GA at delivery: 39w4d  Outcome: Living   Anesthesia during delivery: None   Intrapartum complications: None   Feeding method: Breastfeeding Status: Yes     Procedures: none  Contraception at discharge: none  Doing well.  No complaints    Pertinent Physical Exam At Time of Discharge    General: Examination reveals a well developed, well nourished, female, in no acute distress. She is alert and cooperative.  Lungs: symmetrical, non-labored breathing.  Cardiac: warm, well-perfused.  Abdomen: soft, non-tender.  Fundus: firm, at umbilicus, and nontender.  Extremities: no redness or tenderness in the calves or thighs.  Neurological: alert, oriented, normal speech, no focal findings or movement disorder noted.      Discharge Meds     Your medication list        START taking these medications        Instructions Last Dose Given Next Dose Due   acetaminophen 325 mg tablet  Commonly known as: Tylenol      Take 3 tablets (975 mg) by mouth every 6 hours.       ibuprofen 600 mg tablet      Take 1 tablet (600 mg) by mouth every 6 hours.                 Where to Get Your Medications        These medications were sent to [facility]  [address] [place] [address]      Hours: 8:30 AM to 5 PM Mon-Fri Phone: [phone]   acetaminophen 325 mg tablet  ibuprofen 600 mg tablet          Complications Requiring Follow-Up  Heavy vaginal bleeding, passing clots, fever and/or chills      Test Results Pending At Discharge  Pending Labs       No current pending labs.            Outpatient Follow-Up  Future Appointments   Date Time Provider Department Center   2/6/2024  2:00 PM Jesenia Noland MD [id number]       I spent 5 minutes in the professional and overall care of this patient.      Concepcion Goncalves,  APRN-CNP

## 2024-02-01 NOTE — CARE PLAN
The patient's goals for the shift include Maintain safe environment for     The clinical goals for the shift include Maintain safe environment for     Over the shift, the patient did not make progress toward the following goals.   Problem: Postpartum  Goal: Experiences normal postpartum course  Outcome: Progressing     Problem: Postpartum  Goal: Appropriate maternal -  bonding  Outcome: Progressing

## 2024-02-23 ENCOUNTER — OFFICE VISIT (OUTPATIENT)
Dept: OBSTETRICS AND GYNECOLOGY | Facility: HOSPITAL | Age: 34
End: 2024-02-23
Payer: COMMERCIAL

## 2024-02-23 VITALS — DIASTOLIC BLOOD PRESSURE: 86 MMHG | SYSTOLIC BLOOD PRESSURE: 129 MMHG | WEIGHT: 178 LBS

## 2024-02-23 DIAGNOSIS — R87.613 HIGH GRADE SQUAMOUS INTRAEPITHELIAL LESION (HGSIL) ON CYTOLOGIC SMEAR OF CERVIX: ICD-10-CM

## 2024-02-23 DIAGNOSIS — Z37.9 NORMAL LABOR (HHS-HCC): ICD-10-CM

## 2024-02-23 DIAGNOSIS — Z30.09 ENCOUNTER FOR COUNSELING REGARDING CONTRACEPTION: Primary | ICD-10-CM

## 2024-02-23 PROCEDURE — 2500000004 HC RX 250 GENERAL PHARMACY W/ HCPCS (ALT 636 FOR OP/ED): Performed by: OBSTETRICS & GYNECOLOGY

## 2024-02-23 PROCEDURE — 96372 THER/PROPH/DIAG INJ SC/IM: CPT | Performed by: OBSTETRICS & GYNECOLOGY

## 2024-02-23 RX ORDER — ACETAMINOPHEN 325 MG/1
975 TABLET ORAL EVERY 6 HOURS
Qty: 30 TABLET | Refills: 3 | Status: SHIPPED | OUTPATIENT
Start: 2024-02-23 | End: 2024-03-15 | Stop reason: SDUPTHER

## 2024-02-23 RX ORDER — MEDROXYPROGESTERONE ACETATE 150 MG/ML
150 INJECTION, SUSPENSION INTRAMUSCULAR ONCE
Status: COMPLETED | OUTPATIENT
Start: 2024-02-23 | End: 2024-02-23

## 2024-02-23 RX ORDER — IBUPROFEN 600 MG/1
600 TABLET ORAL EVERY 6 HOURS
Qty: 30 TABLET | Refills: 3 | Status: SHIPPED | OUTPATIENT
Start: 2024-02-23 | End: 2024-03-15 | Stop reason: SDUPTHER

## 2024-02-23 RX ADMIN — MEDROXYPROGESTERONE ACETATE 150 MG: 150 INJECTION, SUSPENSION INTRAMUSCULAR at 12:12

## 2024-02-23 ASSESSMENT — ENCOUNTER SYMPTOMS
LOSS OF SENSATION IN FEET: 0
DEPRESSION: 0
OCCASIONAL FEELINGS OF UNSTEADINESS: 0

## 2024-02-23 NOTE — PROGRESS NOTES
Subjective:    Ricardo Pennington is a 34 y.o.  now PPD#24 from [facility].    Pregnancy notable for HSIL pap    She ran out of ibuprofen and tylenol which she requests for headaches. No emergency headache signs they resolve with ibuprofen.  She denies fevers or chills. She is eating, drinking, voiding, and having bowel movements.       Objective:    Vitals:    24 1125   BP: 129/86      Gen: NAD  Abd: soft, NTND  Neuro: alert and oriented  Psych: appropriate affect    Assessment and Plan:    Ricardo Pennington is a  now PPD#24 from  presenting with complaints of headache     - BP normal, no headache at this time  - Would like refill of tylenol and ibuprofen  - Initiate depo today  - Reviewed need for postpartum colpo - requested appointment for colpo in 3 weeks    Dispo: Return to clinic in 3 weeks for postpartum visit    Jesenia Noland MD

## 2024-03-12 ENCOUNTER — TELEPHONE (OUTPATIENT)
Dept: OBSTETRICS AND GYNECOLOGY | Facility: HOSPITAL | Age: 34
End: 2024-03-12
Payer: COMMERCIAL

## 2024-03-12 NOTE — TELEPHONE ENCOUNTER
Attempted to call patient Ms. Pennington to discuss PAP results and recommendation of Colposcopy. Unable to reach patient, called  #[phone] to assist with translation, left voicemail encouraging patient to call back.  Gogo PINONN, RN, CLC       Copied from CRM #[id number]. Topic: Information Request - Prescription Refill FAQ  >> Feb 26, 2024 12:38 PM Loree HOGAN wrote:  PT stated the pharmacy said the doctor has to call in for the prescription.

## 2024-03-12 NOTE — PROGRESS NOTES
Subjective   34 y.o.  presenting for postpartum follow-up   Delivery Date: 2024   GA at Delivery: 39w4  Type of Delivery: Vaginal, Spontaneous      Ki jeronimoili  [id number] used    Concerns:   Patient reports having headaches irregularly ; she was prescribed motrin/tylenol as month, but pharmacy she went to ([facility]) said they didn't get the rx so she hasn't gotten them even though they were sent correctly     Pain: irregular vaginal pain when she walks and during sex   Lochia: stopped  Feeding Method:  breastfeeding and bottlefeeding irregularly  Problems urinating or having BM: No  Support at home: Yes with partner and older kids  Sleeping ok: Not a lot     Contraceptive Method: Depo Provera, given before discharge  Sexual Intimacy: Yes    Mood: doing ok  Postpartum Depression: Low Risk  (3/15/2024)    [place]  Depression Scale     Last EPDS Total Score: 0     Last EPDS Self Harm Result: Never       Last pap:  23 HSIL ; not yet had colpo     Vitals:    03/15/24 0941   BP: 134/87       Physical Exam  Constitutional:       Appearance: Normal appearance.   Genitourinary:      Vulva normal.      Genitourinary Comments: No discomfort with touching around external vagina and introitus   Minimal discomfort with movement of cervix (this is similar to the pain when she has sex)   HENT:      Head: Normocephalic.   Pulmonary:      Effort: Pulmonary effort is normal.   Neurological:      Mental Status: She is alert.   Skin:     General: Skin is warm and dry.   Psychiatric:         Mood and Affect: Mood normal.         Behavior: Behavior normal.         Thought Content: Thought content normal.         Judgment: Judgment normal.          Problem List Items Addressed This Visit       Normal labor    Relevant Medications    ibuprofen 600 mg tablet    acetaminophen (Tylenol) 325 mg tablet     Other Visit Diagnoses       Encounter for postpartum visit    -  Primary            IMPRESSIONS:  -discussed  with patient about honoring body and postpartum transition   -encouraged patient to continue to take prenatal vitamins for as long as she is breastfeeding  -reviewed importance of calling with any breast complaints, abnormal bleeding, mood changes, or other concerning symptoms - we have many good treatment options for these issues  -cleared to resume normal activity as desired  -patient scheduled for colpo by RN before end of visit    Sirena Jeffery, COURT-GRISELDA

## 2024-03-15 ENCOUNTER — DOCUMENTATION (OUTPATIENT)
Dept: OBSTETRICS AND GYNECOLOGY | Facility: HOSPITAL | Age: 34
End: 2024-03-15

## 2024-03-15 ENCOUNTER — PHARMACY VISIT (OUTPATIENT)
Dept: PHARMACY | Facility: CLINIC | Age: 34
End: 2024-03-15
Payer: MEDICAID

## 2024-03-15 ENCOUNTER — POSTPARTUM VISIT (OUTPATIENT)
Dept: OBSTETRICS AND GYNECOLOGY | Facility: HOSPITAL | Age: 34
End: 2024-03-15
Payer: COMMERCIAL

## 2024-03-15 VITALS — SYSTOLIC BLOOD PRESSURE: 134 MMHG | WEIGHT: 182 LBS | DIASTOLIC BLOOD PRESSURE: 87 MMHG

## 2024-03-15 DIAGNOSIS — R87.613 HIGH GRADE SQUAMOUS INTRAEPITHELIAL LESION (HGSIL) ON CYTOLOGIC SMEAR OF CERVIX: ICD-10-CM

## 2024-03-15 DIAGNOSIS — N94.10 DYSPAREUNIA IN FEMALE: ICD-10-CM

## 2024-03-15 PROBLEM — Z78.9 LANGUAGE BARRIER: Status: RESOLVED | Noted: 2024-01-17 | Resolved: 2024-03-15

## 2024-03-15 PROBLEM — O99.019 ANEMIA DURING PREGNANCY (HHS-HCC): Status: RESOLVED | Noted: 2024-01-17 | Resolved: 2024-03-15

## 2024-03-15 PROBLEM — Z34.90 ENCOUNTER FOR SUPERVISION OF NORMAL PREGNANCY, ANTEPARTUM (HHS-HCC): Status: RESOLVED | Noted: 2024-01-17 | Resolved: 2024-03-15

## 2024-03-15 PROBLEM — Z37.9 NORMAL LABOR (HHS-HCC): Status: RESOLVED | Noted: 2024-01-30 | Resolved: 2024-03-15

## 2024-03-15 PROCEDURE — RXMED WILLOW AMBULATORY MEDICATION CHARGE

## 2024-03-15 PROCEDURE — 99213 OFFICE O/P EST LOW 20 MIN: CPT | Performed by: ADVANCED PRACTICE MIDWIFE

## 2024-03-15 RX ORDER — ACETAMINOPHEN 325 MG/1
975 TABLET ORAL EVERY 6 HOURS
Qty: 30 TABLET | Refills: 3 | Status: SHIPPED | OUTPATIENT
Start: 2024-03-15

## 2024-03-15 RX ORDER — IBUPROFEN 600 MG/1
600 TABLET ORAL EVERY 6 HOURS
Qty: 30 TABLET | Refills: 3 | Status: SHIPPED | OUTPATIENT
Start: 2024-03-15

## 2024-03-15 ASSESSMENT — EDINBURGH POSTNATAL DEPRESSION SCALE (EPDS)
I HAVE BEEN ABLE TO LAUGH AND SEE THE FUNNY SIDE OF THINGS: AS MUCH AS I ALWAYS COULD
TOTAL SCORE: 0
THE THOUGHT OF HARMING MYSELF HAS OCCURRED TO ME: NEVER
I HAVE FELT SCARED OR PANICKY FOR NO GOOD REASON: NO, NOT AT ALL
I HAVE LOOKED FORWARD WITH ENJOYMENT TO THINGS: AS MUCH AS I EVER DID
THINGS HAVE BEEN GETTING ON TOP OF ME: NO, I HAVE BEEN COPING AS WELL AS EVER
I HAVE FELT SAD OR MISERABLE: NO, NOT AT ALL
I HAVE BEEN SO UNHAPPY THAT I HAVE HAD DIFFICULTY SLEEPING: NOT AT ALL
I HAVE BEEN SO UNHAPPY THAT I HAVE BEEN CRYING: NO, NEVER
I HAVE BLAMED MYSELF UNNECESSARILY WHEN THINGS WENT WRONG: NO, NEVER
I HAVE BEEN ANXIOUS OR WORRIED FOR NO GOOD REASON: NO, NOT AT ALL

## 2024-03-15 ASSESSMENT — PAIN SCALES - GENERAL: PAINLEVEL: 3

## 2024-03-15 NOTE — PROGRESS NOTES
CRM received after 2/23 visit that patient was trying to contact office for medication refill  Attempted to reach patient multiple times via phone call with no return call received  Upon chart review patient needs to be scheduled for colposcopy  Patient in office today 3/15 for postpartum visit  Spoke with patient in office via  about procedure  Patient scheduled for 4/12 9:30am for Colpo with Dr. Noland  Patient amendable to this date and time  Tammy Hernandez RN

## 2024-04-12 ENCOUNTER — TELEPHONE (OUTPATIENT)
Dept: OBSTETRICS AND GYNECOLOGY | Facility: HOSPITAL | Age: 34
End: 2024-04-12
Payer: COMMERCIAL

## 2024-04-16 NOTE — TELEPHONE ENCOUNTER
Went to contact patient to reschedule no showed Colpo  Patient had rescheduled appointment for 4/23  Will retime for day of procedure to ensure patient comes in  Tammy Hernandez RN

## 2024-04-30 NOTE — PROGRESS NOTES
Ricardo Pennington is a 34 y.o. here for follow up.     Patient states that headaches are much better.     Patient interested in Depo again. Unsure about when her next Depo is due.    Patient and FOB have a lot of questions about immigration and how to become citizens.     GynHx:  No LMP recorded (approximate).     Current contraception: Depo Provera  HPV vaccination: No x0  Last pap: 23 HSIL ; patient scheduled for colpos on  and  (no showed) and then again on 24     [employer]  #[id number] used.     OB History          7    Para   7    Term   7            AB        Living   7         SAB        IAB        Ectopic        Multiple   0    Live Births   7                  Past Medical History:   Diagnosis Date    HSIL on Pap smear of cervix 2023       No past surgical history on file.    Objective   /83   Wt 87.1 kg (192 lb)   LMP  (Approximate)   Breastfeeding Yes     Physical Exam  Constitutional:       Appearance: Normal appearance.   HENT:      Head: Normocephalic.   Pulmonary:      Effort: Pulmonary effort is normal.   Neurological:      Mental Status: She is alert.   Skin:     General: Skin is warm and dry.   Psychiatric:         Mood and Affect: Mood normal.         Behavior: Behavior normal.         Thought Content: Thought content normal.         Judgment: Judgment normal.         Printed out  Depo appointment  Reviewed  colpo appointment and importance of keeping that appointment  Discussed at length about steps to obtain [place] citizenship; discussed legal assistance possibly available at [employer], they decline for now until they feel like their English is good enough to maybe pass the citizenship questions portion of their application     ANDREA Lofton

## 2024-05-02 ENCOUNTER — TELEPHONE (OUTPATIENT)
Dept: OBSTETRICS AND GYNECOLOGY | Facility: HOSPITAL | Age: 34
End: 2024-05-02
Payer: COMMERCIAL

## 2024-05-02 NOTE — TELEPHONE ENCOUNTER
Patient had been scheduled for Colposcopy on 4/12 and 4/23, both appointments were no showed  Attempted to contact patient to get rescheduled  Reached patient today via Swahili  line  Rescheduled patient for Colposcopy with Dr. Noland 6/7/24  Patient had no questions pertaining to procedure  Encouraged patient to keep follow up visit that she has scheduled for tomorrow with Sirena  Will take with patient in office tomorrow to make sure she does not have any questions or concerns related to colpo  Tammy Hernandez RN

## 2024-05-03 ENCOUNTER — OFFICE VISIT (OUTPATIENT)
Dept: OBSTETRICS AND GYNECOLOGY | Facility: HOSPITAL | Age: 34
End: 2024-05-03
Payer: COMMERCIAL

## 2024-05-03 VITALS — DIASTOLIC BLOOD PRESSURE: 83 MMHG | SYSTOLIC BLOOD PRESSURE: 125 MMHG | WEIGHT: 192 LBS

## 2024-05-03 DIAGNOSIS — Z30.09 BIRTH CONTROL COUNSELING: Primary | ICD-10-CM

## 2024-05-03 DIAGNOSIS — Z63.79 OTHER STRESSFUL LIFE EVENTS AFFECTING FAMILY AND HOUSEHOLD: ICD-10-CM

## 2024-05-03 PROCEDURE — 1036F TOBACCO NON-USER: CPT | Performed by: ADVANCED PRACTICE MIDWIFE

## 2024-05-03 PROCEDURE — 99213 OFFICE O/P EST LOW 20 MIN: CPT | Performed by: ADVANCED PRACTICE MIDWIFE

## 2024-06-12 ENCOUNTER — TELEPHONE (OUTPATIENT)
Dept: OBSTETRICS AND GYNECOLOGY | Facility: HOSPITAL | Age: 34
End: 2024-06-12
Payer: COMMERCIAL

## 2024-06-12 NOTE — TELEPHONE ENCOUNTER
----- Message from Jesenia Noland MD sent at 6/12/2024  3:14 PM EDT -----  Regarding: RE: Reschedule colpo  Thank you!!  ----- Message -----  From: Tammy Hernandez RN  Sent: 6/12/2024   2:55 PM EDT  To: Jesenia Noland MD  Subject: RE: Reschedule colpo                             Hi Jesenia, she no showed again 6/7 for her colpo. I am going to send her a certified letter. Just wanted to let you know.   ----- Message -----  From: Jesenia Noland MD  Sent: 4/23/2024  10:16 AM EDT  To: Tammy Hernandez RN  Subject: RE: Reschedule colpo                             Yeah I think we can try one more time and if she no shows for Oneil we can send a certified letter!    Thanks for following up!!    ----- Message -----  From: Tammy Hernandez RN  Sent: 4/23/2024  10:15 AM EDT  To: Jesenia Noland MD  Subject: RE: Reschedule colpo                             Ug she no showed again :( I can try and get ahold of her to reschedule. If she doesn't answer she's supposed to be in 5/3 for a follow up visit with oneil I can try and catch her in office  ----- Message -----  From: Jesenia Noland MD  Sent: 4/12/2024   9:56 AM EDT  To: Hiren Cannon [facility] Clinical Support Staff  Subject: Reschedule colpo                                 Arnold Silva missed her colposcopy today and it is for HSIL - can we try to get her rescheduled in the next month or so? Not urgent, does not need to double book. She needs an .     Thanks!    Jesenia      Patient has been scheduled for Colpo three times and has no showed all visits  Certified letter sent to patient and uploaded to her MyChart informing her of the missed appointments and importance of rescheduling  Reference [id number]  Dr. Noland notified  Tammy Hernandez RN

## 2024-06-26 ENCOUNTER — TELEPHONE VISIT (OUTPATIENT)
Dept: OBSTETRICS AND GYNECOLOGY | Facility: HOSPITAL | Age: 34
End: 2024-06-26
Payer: COMMERCIAL

## 2024-06-26 ENCOUNTER — TELEPHONE (OUTPATIENT)
Dept: INFUSION THERAPY | Facility: HOSPITAL | Age: 34
End: 2024-06-26

## 2024-06-26 NOTE — TELEPHONE ENCOUNTER
Contacted patient at request of GRISELDA Pack with  ID#[id number] to discuss rescheduling colpo appointment. Rescheduled with Dr. Noland for 7/18 @ 8:15am. Reviewed that patient may receive depo at that visit. All questions and concerns were addressed at time of call.  Gogo Johnson MSN, RN, CLC

## 2024-06-26 NOTE — PROGRESS NOTES
Patient's partner called me (but only speaks a little English and I only speak a little Ki-swahili). Called back with [employer]  #[phone].     Patient states that she missed her Depo and colpo appointments and so wants to reschedule both appointments. Discussed I don't schedule appointments, but will pass her request on to an RN. Discussed RN will call her back asap to reschedule. Discussed my recommendation for no UPI given she is late for her Depo. Patient doesn't have any condoms; discussed she could buy them and/or get free ones at her next appointment.     Also discussed about [facility] as an ESL/citizenship resource for her/her family.     All questions answered.     Sirena Jeffery, ANDREA

## 2024-07-18 ENCOUNTER — PROCEDURE VISIT (OUTPATIENT)
Dept: OBSTETRICS AND GYNECOLOGY | Facility: HOSPITAL | Age: 34
End: 2024-07-18
Payer: COMMERCIAL

## 2024-07-18 VITALS — WEIGHT: 203 LBS | DIASTOLIC BLOOD PRESSURE: 80 MMHG | SYSTOLIC BLOOD PRESSURE: 122 MMHG

## 2024-07-18 DIAGNOSIS — R87.613 HIGH GRADE SQUAMOUS INTRAEPITHELIAL LESION (HGSIL) ON CYTOLOGIC SMEAR OF CERVIX: ICD-10-CM

## 2024-07-18 DIAGNOSIS — Z30.42 DEPO-PROVERA CONTRACEPTIVE STATUS: Primary | ICD-10-CM

## 2024-07-18 LAB — PREGNANCY TEST URINE, POC: NEGATIVE

## 2024-07-18 PROCEDURE — 81025 URINE PREGNANCY TEST: CPT | Performed by: OBSTETRICS & GYNECOLOGY

## 2024-07-18 PROCEDURE — 2500000004 HC RX 250 GENERAL PHARMACY W/ HCPCS (ALT 636 FOR OP/ED): Performed by: OBSTETRICS & GYNECOLOGY

## 2024-07-18 PROCEDURE — 57454 BX/CURETT OF CERVIX W/SCOPE: CPT | Performed by: OBSTETRICS & GYNECOLOGY

## 2024-07-18 PROCEDURE — 96372 THER/PROPH/DIAG INJ SC/IM: CPT | Performed by: OBSTETRICS & GYNECOLOGY

## 2024-07-18 RX ORDER — MEDROXYPROGESTERONE ACETATE 150 MG/ML
150 INJECTION, SUSPENSION INTRAMUSCULAR ONCE
Status: COMPLETED | OUTPATIENT
Start: 2024-07-18 | End: 2024-07-18

## 2024-07-18 ASSESSMENT — ENCOUNTER SYMPTOMS
DEPRESSION: 0
LOSS OF SENSATION IN FEET: 0
OCCASIONAL FEELINGS OF UNSTEADINESS: 0

## 2024-07-18 ASSESSMENT — PAIN SCALES - GENERAL: PAINLEVEL: 0-NO PAIN

## 2024-07-18 NOTE — PROGRESS NOTES
Patient ID: Ricardo Pennington is a 34 y.o. female.    Colposcopy    Date/Time: 7/18/2024 8:23 AM    Performed by: Jesenia Noland MD  Authorized by: Jesenia Noland MD    Procedure location: cervix    Consent:     Risks and benefits of the procedure and its alternatives discussed: yes      Procedural risks discussed:  Bleeding, infection and repeat procedure    Consent obtained:  Written    Consent given by:  Patient  Indication:     Cervical indication(s): cervical HSIL    Pre-procedure:     Prep solution(s): acetic acid    Procedure:     Colposcopy with: colposcopy only, cervical biopsy and endocervical curettage      Biopsy taken: yes      # of biopsies:  2    Biopsy location(s): 1 o'clock, 11 o'clock    Cervix visibility: fully visualized      SCJ visibility: fully visualized      Lesion visualized: fully visualized      Acetowhite lesion(s): cervix      Acetowhite lesion(s) description:  Spanning across anterior os from 11 o'clock to 1 o'clock    Cervical impression: high grade      Ferric subsulfate solution applied: yes    Post-procedure:     Patient tolerance of procedure:  Patient tolerated the procedure well with no immediate complications    Estimated blood loss (mL):  2    Instructions and paperwork completed: yes      Educational handouts given: yes      Jesenia Noland MD

## 2024-07-30 LAB
LABORATORY COMMENT REPORT: NORMAL
PATH REPORT.FINAL DX SPEC: NORMAL
PATH REPORT.GROSS SPEC: NORMAL
PATH REPORT.RELEVANT HX SPEC: NORMAL
PATH REPORT.TOTAL CANCER: NORMAL

## 2024-07-31 ENCOUNTER — TELEPHONE (OUTPATIENT)
Dept: OBSTETRICS AND GYNECOLOGY | Facility: HOSPITAL | Age: 34
End: 2024-07-31
Payer: COMMERCIAL

## 2024-07-31 NOTE — TELEPHONE ENCOUNTER
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             Patient informed that at this time the provider is recommending that she have a procedure called a LEEP  Patient informed that this is an in office procedure that will allow the provider to remove the affected cells  Patient encouraged to take IBU prior to the appointment and informed that it may be uncomfortable   RN educated the patient on the importance of attending appointment and assisted patient with scheduling    Patient scheduled for sept 19 with Dr Noland @ [place] [id number]  Patient denies any further questions or concerns  RN encouraged patient to contact office if she has any further questions or concerns  Patient verbalized understanding of all educational points   PRANEETH Carlton-RN          ----- Message from Jesenia Noland sent at 7/30/2024  1:38 PM EDT -----  Hi! Can we get [facility] set up for a LEEP procedure for ELIZABETH 3? When we book it can we block off like 45 minutes and note not to double book so we have time for thorough counseling with the ? Thank you!

## 2024-09-19 ENCOUNTER — TELEPHONE (OUTPATIENT)
Dept: OBSTETRICS AND GYNECOLOGY | Facility: HOSPITAL | Age: 34
End: 2024-09-19
Payer: COMMERCIAL

## 2024-09-19 NOTE — TELEPHONE ENCOUNTER
----- Message from Jesenia Noland sent at 9/19/2024  9:28 AM EDT -----  Ricardo didn't come :(. When we reschedule her can we make sure it is a 30 min appointment that can't be double booked? Thanks!    Spoke with patient via  line  Patient forgot about her appointment today  Rescheduled for LEEP 10/24 with Dr. Noland  Will call patient 10/23 to remind her about appointment  Encouraged patient to call office with any questions or concerns  Tammy Hernandez RN

## 2024-10-03 ENCOUNTER — APPOINTMENT (OUTPATIENT)
Dept: OBSTETRICS AND GYNECOLOGY | Facility: HOSPITAL | Age: 34
End: 2024-10-03
Payer: COMMERCIAL

## 2024-10-23 ENCOUNTER — TELEPHONE (OUTPATIENT)
Dept: OBSTETRICS AND GYNECOLOGY | Facility: HOSPITAL | Age: 34
End: 2024-10-23
Payer: COMMERCIAL

## 2024-10-23 NOTE — TELEPHONE ENCOUNTER
Contacted patient via language line  to remind her about her LEEP procedure that is scheduled for tomorrow   Patient states she now lives out of state in [place] and is unable to come to appointment  Recommended patient establish care with obgyn where she is located to receive the care she needs  Patient verbalized understanding  Will notify provider  Tammy Hernandez RN

## 2024-10-24 ENCOUNTER — APPOINTMENT (OUTPATIENT)
Dept: OBSTETRICS AND GYNECOLOGY | Facility: HOSPITAL | Age: 34
End: 2024-10-24
Payer: COMMERCIAL